# Patient Record
(demographics unavailable — no encounter records)

---

## 2018-09-26 NOTE — ERPHSYRPT
- History of Present Illness


Time Seen by Provider: 09/26/18 21:05


Source: patient, family


Exam Limitations: no limitations


Physician History: 





The patient is a 35-year-old male with his wife complaining of a gradual onset 

of intermittent cough and shortness of breath for one month.  He smokes 

cigarettes.  His cough was better after a couple of weeks and got worse again 

this is happened 2 or 3 times.  For the last 2 or 3 days has had a cough with 

sputum production.  Since yesterday he has a pain in his chest especially with 

coughing in the upper central part of his chest.  He says it feels like there 

is something that needs to be coughed out of his chest.  His past medical 

history is unremarkable.  He takes no prescription medicines.


Timing/Duration: gradual onset (1 month ago), worse


Cough Quality/Degree: moderate, productive cough


Possible Cause: no prior episodes


Modifying Factors: Improves With: coughing


Associated Symptoms: chest pain/soreness, cough, shortness of breath


Allergies/Adverse Reactions: 








No Known Drug Allergies Allergy (Verified 09/26/18 21:09)


 








- Review of Systems


Constitutional: No Fever, No Chills


Eyes: No Symptoms


Ears, Nose, & Throat: No Symptoms


Respiratory: Cough, Dyspnea


Cardiac: Chest Pain


Abdominal/Gastrointestinal: No Abdominal Pain, No Nausea, No Vomiting, No 

Diarrhea


Genitourinary Symptoms: No Dysuria


Musculoskeletal: No Back Pain, No Neck Pain


Skin: No Rash


Neurological: No Dizziness, No Focal Weakness, No Sensory Changes


Psychological: No Symptoms


Endocrine: No Symptoms


Hematologic/Lymphatic: No Symptoms


Immunological/Allergic: No Symptoms


All Other Systems: Reviewed and Negative





- Past Medical History


Neurological History: No Pertinent History


Cardiac History: No Pertinent History


Respiratory History: No Pertinent History


Endocrine Medical History: No Pertinent History


Musculoskeletal History: No Pertinent History





- Nursing Vital Signs


Nursing Vital Signs: 


 Initial Vital Signs











Temperature  98.5 F   09/26/18 21:00


 


Pulse Rate  140 H  09/26/18 21:00


 


Respiratory Rate  30 H  09/26/18 21:00


 


Blood Pressure  136/82   09/26/18 21:00


 


O2 Sat by Pulse Oximetry  100   09/26/18 21:00








 Pain Scale











Pain Intensity                 10

















- Physical Exam


General Appearance: moderate distress, anxiety


Eye Exam: PERRL/EOMI, eyes nml inspection


Ears, Nose, Throat Exam: TMs normal, pharynx normal, moist mucous membranes, 

pharyngeal erythema, other (red and swollen uvula)


Neck Exam: normal inspection, non-tender, supple, full range of motion


Respiratory Exam: rhonchi (mild), No chest tenderness


Cardiovascular Exam: regular rate/rhythm, normal heart sounds


Gastrointestinal/Abdomen Exam: soft, No tenderness


Rectal Exam: not done


Back Exam: normal inspection, No CVA tenderness, No vertebral tenderness


Extremity Exam: normal inspection, normal range of motion


Neurologic Exam: alert, oriented x 3, cooperative, normal mood/affect, 

sensation nml, No motor deficits


Skin Exam: normal color, warm, dry, No rash


Lymphatic Exam: No adenopathy


**SpO2 Interpretation**: normal


Oxygen Delivery: Room Air





- Course


EKG Interpreted by Me: RATE, Sinus Tach, NORMAL AXIS, NORMAL INTERVALS, NORMAL 

QRS, NORMAL ST-T





- Radiology Exams


  ** Chest


X-ray Interpretation: Interpreted by me, Negative, No Pneumonia, No Infiltrates

, Other (hyperventilated 2V chest.)


Ordered Tests: 


 Active Orders 24 hr











 Category Date Time Status


 


 Cardiac Monitor STAT Care  09/26/18 21:10 Active


 


 EKG-ER Only STAT Care  09/26/18 21:09 Active


 


 IV Insertion STAT Care  09/26/18 21:09 Active


 


 Oxygen-ED Only NASAL CANNULA 2 lpm Care  09/26/18 21:09 Active


 


 Pulse Oximetry (ED) STAT Care  09/26/18 21:09 Active


 


 CHEST 2 VIEWS (PA AND LAT) Stat Exams  09/26/18 21:09 Taken


 


 CBC W DIFF Stat Lab  09/26/18 21:35 Completed


 


 CMP Stat Lab  09/26/18 21:35 Completed


 


 D-DIMER QUANTITATION Stat Lab  09/26/18 21:35 Completed


 


 Lactic Acid Stat Lab  09/26/18 21:15 Completed


 


 NT PRO BNP Stat Lab  09/26/18 21:35 Completed


 


 TROPONIN Q3H Lab  09/26/18 21:35 Completed


 


 TROPONIN Q3H Lab  09/27/18 00:15 Ordered


 


 TROPONIN Q3H Lab  09/27/18 03:15 Ordered


 


 TROPONIN Q3H Lab  09/27/18 06:15 Ordered


 


 TROPONIN Q3H Lab  09/27/18 09:15 Ordered


 


 Respiratory Nebulizer STAT RT  09/26/18 21:10 Completed


 


 Respiratory Therapy Assessment DAILY RT  09/26/18 21:45 Active








Medication Summary














Discontinued Medications














Generic Name Dose Route Start Last Admin





  Trade Name Freq  PRN Reason Stop Dose Admin


 


Albuterol Sulfate  2.5 mg  09/26/18 21:09  09/26/18 21:18





  Proventil 2.5 Mg/3 Ml Neb***  IH  09/26/18 21:10  2.5 mg





  STAT ONE   Administration





     





     





     





     


 


Albuterol Sulfate  Confirm  09/26/18 21:15  





  Proventil 2.5 Mg/3 Ml Neb***  Administered  09/26/18 21:16  





  Dose   





  2.5 mg   





  IH   





  .STK-MED ONE   





     





     





     





     


 


Sodium Chloride  1,000 mls @ 999 mls/hr  09/26/18 21:09  09/26/18 21:32





  Sodium Chloride 0.9% 1000 Ml  IV  09/26/18 22:09  999 mls/hr





  .Q1H1M STA   Administration





     





     





     





     


 


Sodium Chloride  Confirm  09/26/18 21:30  





  Sodium Chloride 0.9% 1000 Ml  Administered  09/26/18 21:31  





  Dose   





  1,000 mls @ ud   





  .ROUTE   





  .STK-MED ONE   





     





     





     





     











Lab/Rad Data: 


 Laboratory Result Diagrams





 09/26/18 21:35 





 09/26/18 21:35 





 Laboratory Results











  09/26/18 09/26/18 09/26/18 Range/Units





  21:35 21:35 21:35 


 


WBC     (4.0-10.5)  K/mm3


 


RBC     (4.1-5.6)  M/mm3


 


Hgb     (12.5-18.0)  gm/dl


 


Hct     (42-50)  %


 


MCV     ()  fl


 


MCH     (26-32)  pg


 


MCHC     (32-36)  g/dl


 


RDW     (11.5-14.0)  %


 


Plt Count     (150-450)  K/mm3


 


MPV     (6-9.5)  fl


 


Gran %     (36.0-66.0)  %


 


Eos # (Auto)     (0-0.5)  


 


Absolute Lymphs (auto)     (1.0-4.6)  


 


Absolute Monos (auto)     (0.0-1.3)  


 


Lymphocytes %     (24.0-44.0)  %


 


Monocytes %     (0.0-12.0)  %


 


Eosinophils %     (0.00-5.0)  %


 


Basophils %     (0.0-0.4)  %


 


Absolute Granulocytes     (1.4-6.9)  


 


Basophils #     (0-0.4)  


 


D-Dimer    < 215 L  (215-500)  ng/mL


 


Sodium     (137-145)  mmol/L


 


Potassium     (3.5-5.1)  mmol/L


 


Chloride     ()  mmol/L


 


Carbon Dioxide     (22-30)  mmol/L


 


Anion Gap     (5-15)  MEQ/L


 


BUN     (9-20)  mg/dL


 


Creatinine     (0.66-1.25)  mg/dL


 


Estimated GFR     ML/MIN


 


Glucose     ()  mg/dL


 


Lactic Acid     (0.4-2.0)  


 


Calcium     (8.4-10.2)  mg/dL


 


Total Bilirubin     (0.2-1.3)  mg/dL


 


AST     (17-59)  U/L


 


ALT     (0-50)  U/L


 


Alkaline Phosphatase     ()  U/L


 


Troponin I   < 0.012   (0.000-0.034)  ng/mL


 


NT-Pro-B Natriuret Pep     (0-450)  pg/mL


 


Serum Total Protein     (6.3-8.2)  g/dL


 


Albumin     (3.5-5.0)  g/dL


 


Influenza Type A Ag  NEGATIVE    (NEGATIVE)  


 


Influenza Type B Ag  NEGATIVE    (NEGATIVE)  


 


RSV (PCR)  NEGATIVE    (Negative)  














  09/26/18 09/26/18 09/26/18 Range/Units





  21:35 21:35 21:15 


 


WBC   7.9   (4.0-10.5)  K/mm3


 


RBC   4.77   (4.1-5.6)  M/mm3


 


Hgb   15.8   (12.5-18.0)  gm/dl


 


Hct   43.1   (42-50)  %


 


MCV   90.4   ()  fl


 


MCH   33.1 H   (26-32)  pg


 


MCHC   36.7 H   (32-36)  g/dl


 


RDW   13.8   (11.5-14.0)  %


 


Plt Count   261   (150-450)  K/mm3


 


MPV   9.1   (6-9.5)  fl


 


Gran %   61.8   (36.0-66.0)  %


 


Eos # (Auto)   0.04   (0-0.5)  


 


Absolute Lymphs (auto)   2.29   (1.0-4.6)  


 


Absolute Monos (auto)   0.69   (0.0-1.3)  


 


Lymphocytes %   28.9   (24.0-44.0)  %


 


Monocytes %   8.7   (0.0-12.0)  %


 


Eosinophils %   0.5   (0.00-5.0)  %


 


Basophils %   0.1   (0.0-0.4)  %


 


Absolute Granulocytes   4.90   (1.4-6.9)  


 


Basophils #   0.01   (0-0.4)  


 


D-Dimer     (215-500)  ng/mL


 


Sodium  142    (137-145)  mmol/L


 


Potassium  3.5    (3.5-5.1)  mmol/L


 


Chloride  102    ()  mmol/L


 


Carbon Dioxide  26    (22-30)  mmol/L


 


Anion Gap  17.4 H    (5-15)  MEQ/L


 


BUN  15    (9-20)  mg/dL


 


Creatinine  0.82    (0.66-1.25)  mg/dL


 


Estimated GFR  > 60.0    ML/MIN


 


Glucose  99    ()  mg/dL


 


Lactic Acid    1.7  (0.4-2.0)  


 


Calcium  9.7    (8.4-10.2)  mg/dL


 


Total Bilirubin  1.20    (0.2-1.3)  mg/dL


 


AST  24    (17-59)  U/L


 


ALT  22    (0-50)  U/L


 


Alkaline Phosphatase  71    ()  U/L


 


Troponin I     (0.000-0.034)  ng/mL


 


NT-Pro-B Natriuret Pep  35.2    (0-450)  pg/mL


 


Serum Total Protein  8.1    (6.3-8.2)  g/dL


 


Albumin  5.0    (3.5-5.0)  g/dL


 


Influenza Type A Ag     (NEGATIVE)  


 


Influenza Type B Ag     (NEGATIVE)  


 


RSV (PCR)     (Negative)  














- Progress


Progress: improved


Air Movement: good


Blood Culture(s) Obtained: No


Antibiotics given: Yes


Counseled pt/family regarding: lab results, diagnosis, need for follow-up, rad 

results, smoking cessation





- Departure


Time of Disposition: 22:20


Departure Disposition: Home


Clinical Impression: 


 Bronchitis





Condition: Stable


Critical Care Time: No


Referrals: 


DOCTOR,NO FAMILY [NON-STAFF PHY W/O PRIVILEGES] - 


Additional Instructions: 


You have bronchitis.  You were given an albuterol nebulizer breathing treatment 

in the ER.  You were also given Rocephin 1 g by IV in the ER.  Take 

azithromycin 500 mg on day 1 then 250 mg each day for days 2 through 5.  Follow-

up with your primary care doctor next week.


Prescriptions: 


Azithromycin 250 mg*** [Zithromax 250 MG TABLET***] 250 mg PO ZPACK #6 tablet

## 2018-09-27 NOTE — XRAY
Exam: Two-view chest from 9/26/2018.



Comparison: None.



Indication: 35-year-old male with cough, shortness breath, chest pain, history

of cigarette smoking for 19 years.



Findings: 2 Upright PA films and a lateral chest film were obtained.



There is mild hyperinflation of the lungs with a prominent retrosternal

airspace.  Correlate clinically regarding COPD.  The heart size and contour

are normal.  The jillian and mediastinal structures appear unremarkable.  EKG

leads are noted.  No air space infiltrates, vascular congestion, pneumothorax,

or pleural fluid is seen.  Minimal focal pleural tenting is seen at the

lateral aspect of the right hemidiaphragm.  This is nonspecific, but may be

due to minimal scarring.  No acute osseous process is seen.



Impression:



1.  Hyperinflated chest revealing no infiltrates, heart failure, pneumothorax,

or other acute cardiopulmonary disease.  Correlate clinically regarding an

element of COPD.